# Patient Record
Sex: FEMALE | Race: WHITE | NOT HISPANIC OR LATINO | Employment: OTHER | URBAN - METROPOLITAN AREA
[De-identification: names, ages, dates, MRNs, and addresses within clinical notes are randomized per-mention and may not be internally consistent; named-entity substitution may affect disease eponyms.]

---

## 2019-01-10 ENCOUNTER — OFFICE VISIT CONVERTED (OUTPATIENT)
Dept: GASTROENTEROLOGY | Facility: CLINIC | Age: 64
End: 2019-01-10
Attending: INTERNAL MEDICINE

## 2019-01-25 ENCOUNTER — HOSPITAL ENCOUNTER (OUTPATIENT)
Dept: GASTROENTEROLOGY | Facility: HOSPITAL | Age: 64
Setting detail: HOSPITAL OUTPATIENT SURGERY
Discharge: HOME | End: 2019-01-25
Attending: INTERNAL MEDICINE

## 2021-05-15 VITALS
DIASTOLIC BLOOD PRESSURE: 87 MMHG | HEART RATE: 81 BPM | WEIGHT: 246.19 LBS | BODY MASS INDEX: 37.31 KG/M2 | HEIGHT: 68 IN | OXYGEN SATURATION: 91 % | SYSTOLIC BLOOD PRESSURE: 151 MMHG | RESPIRATION RATE: 12 BRPM

## 2022-05-03 ENCOUNTER — PREP FOR SURGERY (OUTPATIENT)
Dept: OTHER | Facility: HOSPITAL | Age: 67
End: 2022-05-03

## 2022-05-03 ENCOUNTER — CLINICAL SUPPORT (OUTPATIENT)
Dept: GASTROENTEROLOGY | Facility: CLINIC | Age: 67
End: 2022-05-03

## 2022-05-03 DIAGNOSIS — Z12.11 COLON CANCER SCREENING: Primary | ICD-10-CM

## 2022-05-03 RX ORDER — METOPROLOL TARTRATE 100 MG/1
50 TABLET ORAL 2 TIMES DAILY
COMMUNITY

## 2022-05-03 RX ORDER — CHOLECALCIFEROL (VITAMIN D3) 125 MCG
CAPSULE ORAL
COMMUNITY

## 2022-05-03 RX ORDER — LISINOPRIL 10 MG/1
10 TABLET ORAL NIGHTLY
COMMUNITY

## 2022-05-03 RX ORDER — ASCORBIC ACID 125 MG
TABLET,CHEWABLE ORAL
COMMUNITY

## 2022-05-03 RX ORDER — MULTIVIT-MINERALS/FOLIC ACID 200 MCG
TABLET,CHEWABLE ORAL
COMMUNITY

## 2022-05-03 RX ORDER — MOMETASONE FUROATE 1 MG/G
CREAM TOPICAL
Status: ON HOLD | COMMUNITY
End: 2022-08-23

## 2022-05-03 RX ORDER — UBIDECARENONE 100 MG
100 CAPSULE ORAL DAILY
COMMUNITY

## 2022-05-03 RX ORDER — ANASTROZOLE 1 MG/1
TABLET ORAL
COMMUNITY
End: 2022-08-22

## 2022-05-03 RX ORDER — VITAMIN E 200 UNIT
CAPSULE ORAL
COMMUNITY

## 2022-05-03 RX ORDER — CRANBERRY FRUIT EXTRACT 200 MG
CAPSULE ORAL
COMMUNITY

## 2022-05-03 NOTE — PROGRESS NOTES
Debi Martin     REASON FOR CALL-colonoscopy/egd, colon screening/barretts, last colon over 10 years ago    SENT IN PREP-[facility]  DOS-07.18.2022  History reviewed. No pertinent past medical history.  No Known Allergies  Past Surgical History:   Procedure Laterality Date   • BREAST LUMPECTOMY  2015   • CATARACT EXTRACTION  2012   • COLONOSCOPY  2012   • HYSTERECTOMY  1993   • UPPER GASTROINTESTINAL ENDOSCOPY  2019     Social History     Socioeconomic History   • Marital status:    Tobacco Use   • Smoking status: Passive Smoke Exposure - Never Smoker   • Smokeless tobacco: Never Used   Vaping Use   • Vaping Use: Never used   Substance and Sexual Activity   • Alcohol use: Yes     Comment: occationally   • Drug use: Defer   • Sexual activity: Defer     Family History   Problem Relation Age of Onset   • Colon cancer Neg Hx        Current Outpatient Medications:   •  anastrozole (ARIMIDEX) 1 MG tablet, anastrozole 1 mg oral tablet take 1 tablet (1 mg) by oral route once daily   Active, Disp: , Rfl:   •  Cobalamin Combinations (B-12 Plus Folic Acid) 2500-400 MCG tablet dispersible, , Disp: , Rfl:   •  coenzyme Q10 100 MG capsule, , Disp: , Rfl:   •  esomeprazole (nexIUM) 20 MG capsule, Nexium 24HR 20 mg oral capsule,delayed release(DR/EC) take 1 capsule (20 mg) by oral route once daily at least 1 hour before a meal swallowing whole. Do not crush or chew granules.   Suspended, Disp: , Rfl:   •  lisinopril (PRINIVIL,ZESTRIL) 10 MG tablet, Take 10 mg by mouth Daily., Disp: , Rfl:   •  Magnesium Citrate 125 MG capsule, , Disp: , Rfl:   •  MegaRed Omega-3 Krill Oil 350 MG capsule, , Disp: , Rfl:   •  metoprolol tartrate (LOPRESSOR) 100 MG tablet, metoprolol tartrate 100 mg oral tablet take 1 tablet (100 mg) by oral route once daily with a meal   Active, Disp: , Rfl:   •  mometasone (ELOCON) 0.1 % cream, mometasone 0.1 % topical cream apply a thin layer to the affected area(s) by topical route once daily   Active, Disp:  , Rfl:   •  Multiple Vitamins-Minerals (One-A-Day VitaCraves) chewable tablet, One-A-Day VitaCraves 200 mcg oral tablet,chewable chew 2 tablets by oral route daily   Active, Disp: , Rfl:   •  Red Yeast Rice Extract 600 MG capsule, , Disp: , Rfl:

## 2022-07-18 ENCOUNTER — ANESTHESIA EVENT (OUTPATIENT)
Dept: GASTROENTEROLOGY | Facility: HOSPITAL | Age: 67
End: 2022-07-18

## 2022-07-18 ENCOUNTER — ANESTHESIA (OUTPATIENT)
Dept: GASTROENTEROLOGY | Facility: HOSPITAL | Age: 67
End: 2022-07-18

## 2022-08-22 NOTE — PRE-PROCEDURE INSTRUCTIONS
PT INSTRUCTED ON CLEAR LIQ DIET AND PO SPLIT PREP LAST BM SHOULD BE CLEAR  PT CAN TAKE NEXIUM, METOPROLOL    WITH A SIP OF WATER IN THE AM OF THE PROCEDURE ARRIVAL TIME IS 0830 AM ON 8/23/22

## 2022-08-23 ENCOUNTER — HOSPITAL ENCOUNTER (OUTPATIENT)
Facility: HOSPITAL | Age: 67
Setting detail: HOSPITAL OUTPATIENT SURGERY
Discharge: HOME | End: 2022-08-23
Attending: INTERNAL MEDICINE | Admitting: INTERNAL MEDICINE

## 2022-08-23 VITALS
RESPIRATION RATE: 16 BRPM | TEMPERATURE: 97 F | OXYGEN SATURATION: 96 % | SYSTOLIC BLOOD PRESSURE: 133 MMHG | WEIGHT: 227.96 LBS | HEIGHT: 68 IN | BODY MASS INDEX: 34.55 KG/M2 | HEART RATE: 66 BPM | DIASTOLIC BLOOD PRESSURE: 73 MMHG

## 2022-08-23 DIAGNOSIS — Z12.11 COLON CANCER SCREENING: ICD-10-CM

## 2022-08-23 PROCEDURE — 88305 TISSUE EXAM BY PATHOLOGIST: CPT | Performed by: INTERNAL MEDICINE

## 2022-08-23 PROCEDURE — 25010000002 PROPOFOL 10 MG/ML EMULSION: Performed by: NURSE ANESTHETIST, CERTIFIED REGISTERED

## 2022-08-23 PROCEDURE — 43239 EGD BIOPSY SINGLE/MULTIPLE: CPT | Performed by: INTERNAL MEDICINE

## 2022-08-23 PROCEDURE — 45385 COLONOSCOPY W/LESION REMOVAL: CPT | Performed by: INTERNAL MEDICINE

## 2022-08-23 RX ORDER — LIDOCAINE HYDROCHLORIDE 20 MG/ML
INJECTION, SOLUTION EPIDURAL; INFILTRATION; INTRACAUDAL; PERINEURAL AS NEEDED
Status: DISCONTINUED | OUTPATIENT
Start: 2022-08-23 | End: 2022-08-23 | Stop reason: SURG

## 2022-08-23 RX ORDER — SODIUM CHLORIDE, SODIUM LACTATE, POTASSIUM CHLORIDE, CALCIUM CHLORIDE 600; 310; 30; 20 MG/100ML; MG/100ML; MG/100ML; MG/100ML
30 INJECTION, SOLUTION INTRAVENOUS CONTINUOUS
Status: DISCONTINUED | OUTPATIENT
Start: 2022-08-23 | End: 2022-08-23 | Stop reason: HOSPADM

## 2022-08-23 RX ORDER — PROPOFOL 10 MG/ML
VIAL (ML) INTRAVENOUS AS NEEDED
Status: DISCONTINUED | OUTPATIENT
Start: 2022-08-23 | End: 2022-08-23 | Stop reason: SURG

## 2022-08-23 RX ADMIN — SODIUM CHLORIDE, POTASSIUM CHLORIDE, SODIUM LACTATE AND CALCIUM CHLORIDE 30 ML/HR: 600; 310; 30; 20 INJECTION, SOLUTION INTRAVENOUS at 09:03

## 2022-08-23 RX ADMIN — PROPOFOL 200 MCG/KG/MIN: 10 INJECTION, EMULSION INTRAVENOUS at 10:07

## 2022-08-23 RX ADMIN — PROPOFOL 50 MG: 10 INJECTION, EMULSION INTRAVENOUS at 10:07

## 2022-08-23 RX ADMIN — LIDOCAINE HYDROCHLORIDE 100 MG: 20 INJECTION, SOLUTION EPIDURAL; INFILTRATION; INTRACAUDAL; PERINEURAL at 10:07

## 2022-08-23 NOTE — H&P
ScreeningPre Procedure History & Physical    Chief Complaint:   Screening   Wilde's  gerd    Subjective     HPI:   Screening     Past Medical History:   Past Medical History:   Diagnosis Date   • History of radiation therapy    • Hypertension        Past Surgical History:  Past Surgical History:   Procedure Laterality Date   • BREAST LUMPECTOMY  2015   • CATARACT EXTRACTION  2012   • COLONOSCOPY  2012   • HYSTERECTOMY  1993   • MASTECTOMY COMPLETE / SIMPLE Bilateral 06/30/2015   • UPPER GASTROINTESTINAL ENDOSCOPY  2019       Family History:  Family History   Problem Relation Age of Onset   • Colon cancer Neg Hx    • Malig Hyperthermia Neg Hx        Social History:   reports that she is a non-smoker but has been exposed to tobacco smoke. She has never used smokeless tobacco. She reports current alcohol use. Drug use questions deferred to the physician.    Medications:   Medications Prior to Admission   Medication Sig Dispense Refill Last Dose   • esomeprazole (nexIUM) 20 MG capsule Take 20 mg by mouth Every Morning Before Breakfast.   8/22/2022 at Unknown time   • lisinopril (PRINIVIL,ZESTRIL) 10 MG tablet Take 10 mg by mouth Every Night.   8/22/2022 at Unknown time   • metoprolol tartrate (LOPRESSOR) 100 MG tablet Take 50 mg by mouth 2 (Two) Times a Day.   8/22/2022 at Unknown time   • Cobalamin Combinations (B-12 Plus Folic Acid) 2500-400 MCG tablet dispersible NOT TAKING   8/17/2022   • coenzyme Q10 100 MG capsule Take 100 mg by mouth Daily. NOT TAKING   8/17/2022   • Magnesium Citrate 125 MG capsule NOT TAKING   8/17/2022   • MegaRed Omega-3 Krill Oil 350 MG capsule NOT TAKING   8/17/2022   • Multiple Vitamins-Minerals (One-A-Day VitaCraves) chewable tablet NOT TAKING      • Red Yeast Rice Extract 600 MG capsule NOT TAKING   8/17/2022       Allergies:  Patient has no known allergies.        Objective     Blood pressure 180/86, pulse 67, temperature 96.9 °F (36.1 °C), temperature source Temporal, resp. rate 16,  "height 172.7 cm (68\"), weight 103 kg (227 lb 15.3 oz), SpO2 100 %.    Physical Exam   Constitutional: Pt is oriented to person, place, and time and well-developed, well-nourished, and in no distress.   Mouth/Throat: Oropharynx is clear and moist.   Neck: Normal range of motion.   Cardiovascular: Normal rate, regular rhythm and normal heart sounds.    Pulmonary/Chest: Effort normal and breath sounds normal.   Abdominal: Soft. Nontender  Skin: Skin is warm and dry.   Psychiatric: Mood, memory, affect and judgment normal.     Assessment & Plan     Diagnosis:  Screening colonoscopy  H/o colon polyps  Wilde's  gerd     Anticipated Surgical Procedure:  Colonoscopy  egd    The risks, benefits, and alternatives of this procedure have been discussed with the patient or the responsible party- the patient understands and agrees to proceed.            "

## 2022-08-23 NOTE — ANESTHESIA PREPROCEDURE EVALUATION
Anesthesia Evaluation     Patient summary reviewed and Nursing notes reviewed                Airway   Mallampati: I  TM distance: >3 FB  Neck ROM: full  No difficulty expected  Dental      Pulmonary - negative pulmonary ROS and normal exam    breath sounds clear to auscultation  Cardiovascular - normal exam    Rhythm: regular  Rate: normal    (+) hypertension,       Neuro/Psych- negative ROS  GI/Hepatic/Renal/Endo    (+) obesity,       Musculoskeletal (-) negative ROS    Abdominal    Substance History - negative use     OB/GYN negative ob/gyn ROS         Other                        Anesthesia Plan    ASA 2     general     intravenous induction     Anesthetic plan, risks, benefits, and alternatives have been provided, discussed and informed consent has been obtained with: patient.        CODE STATUS:

## 2022-08-23 NOTE — ANESTHESIA POSTPROCEDURE EVALUATION
Patient: Debi Martin    Procedure Summary     Date: 08/23/22 Room / Location: [facility] ENDOSCOPY 3 / [facility] ENDOSCOPY    Anesthesia Start: 1005 Anesthesia Stop: 1045    Procedures:       ESOPHAGOGASTRODUODENOSCOPY WITH BIOPSIES (N/A )      COLONOSCOPY WITH POLYPECTOMY (N/A ) Diagnosis:       Colon cancer screening      (Colon cancer screening [Z12.11])    Surgeons: Emanuel Adams MD Provider: Alpesh Mcpherson MD    Anesthesia Type: general ASA Status: 2          Anesthesia Type: general    Vitals  Vitals Value Taken Time   /75 08/23/22 1043   Temp 36.1 °C (97 °F) 08/23/22 1043   Pulse 70 08/23/22 1043   Resp 19 08/23/22 1043   SpO2 92 % 08/23/22 1043           Post Anesthesia Care and Evaluation    Patient location during evaluation: bedside  Patient participation: complete - patient participated  Level of consciousness: awake  Pain management: adequate    Airway patency: patent  Anesthetic complications: No anesthetic complications  PONV Status: none  Cardiovascular status: acceptable  Respiratory status: acceptable  Hydration status: acceptable    Comments: An Anesthesiologist personally participated in the most demanding procedures (including induction and emergence if applicable) in the anesthesia plan, monitored the course of anesthesia administration at frequent intervals and remained physically present and available for immediate diagnosis and treatment of emergencies.

## 2022-08-25 LAB
CYTO UR: NORMAL
LAB AP CASE REPORT: NORMAL
LAB AP CLINICAL INFORMATION: NORMAL
PATH REPORT.FINAL DX SPEC: NORMAL
PATH REPORT.GROSS SPEC: NORMAL

## 2022-08-26 ENCOUNTER — TELEPHONE (OUTPATIENT)
Dept: GASTROENTEROLOGY | Facility: CLINIC | Age: 67
End: 2022-08-26

## 2022-08-26 NOTE — TELEPHONE ENCOUNTER
----- Message from CE Salazar sent at 8/25/2022  3:19 PM EDT -----  I have reviewed the patient colonoscopy and pathology report. Patient has juvenile polyp(s) on pathology report that are smaller than 10mm in size. It is recommended the patient be on a 5 year recall. Please place in the recall system.     Wilde's esophagus noted on biopsy.  Recall for EGD in 3 years.  Educate patient that we can consider surgical repair of the hiatal hernia if her symptoms of reflux continue or worsen.  She will need a BMI of less than 30 to undergo the procedure.

## 2022-08-29 NOTE — TELEPHONE ENCOUNTER
Spoke to pt and informed of [place] [facility] result note and recommendations. Pt verified understanding.     3 year EGD recall in place.  5 year Colon recall in place.

## (undated) DEVICE — SNAR POLYP CAPTIFLEX XS/OVL 11X2.4MM 240CM 1P/U

## (undated) DEVICE — SOL IRRG H2O PL/BG 1000ML STRL

## (undated) DEVICE — EGD OR ERCP KIT: Brand: MEDLINE INDUSTRIES, INC.

## (undated) DEVICE — Device: Brand: DEFENDO AIR/WATER/SUCTION AND BIOPSY VALVE

## (undated) DEVICE — COLON KIT: Brand: MEDLINE INDUSTRIES, INC.

## (undated) DEVICE — SINGLE-USE BIOPSY FORCEPS: Brand: RADIAL JAW 4

## (undated) DEVICE — THE SINGLE USE ETRAP – POLYP TRAP IS USED FOR SUCTION RETRIEVAL OF ENDOSCOPICALLY REMOVED POLYPS.: Brand: ETRAP